# Patient Record
Sex: FEMALE | Race: WHITE | NOT HISPANIC OR LATINO | Employment: OTHER | URBAN - METROPOLITAN AREA
[De-identification: names, ages, dates, MRNs, and addresses within clinical notes are randomized per-mention and may not be internally consistent; named-entity substitution may affect disease eponyms.]

---

## 2017-03-29 ENCOUNTER — ALLSCRIPTS OFFICE VISIT (OUTPATIENT)
Dept: OTHER | Facility: OTHER | Age: 77
End: 2017-03-29

## 2017-03-29 DIAGNOSIS — G25.0 ESSENTIAL TREMOR: ICD-10-CM

## 2017-03-29 DIAGNOSIS — H55.09 OTHER FORMS OF NYSTAGMUS: ICD-10-CM

## 2017-03-29 DIAGNOSIS — G25.2 OTHER SPECIFIED FORMS OF TREMOR: ICD-10-CM

## 2017-03-31 ENCOUNTER — GENERIC CONVERSION - ENCOUNTER (OUTPATIENT)
Dept: OTHER | Facility: OTHER | Age: 77
End: 2017-03-31

## 2017-04-13 ENCOUNTER — GENERIC CONVERSION - ENCOUNTER (OUTPATIENT)
Dept: OTHER | Facility: OTHER | Age: 77
End: 2017-04-13

## 2017-05-11 ENCOUNTER — ALLSCRIPTS OFFICE VISIT (OUTPATIENT)
Dept: OTHER | Facility: OTHER | Age: 77
End: 2017-05-11

## 2017-05-25 ENCOUNTER — GENERIC CONVERSION - ENCOUNTER (OUTPATIENT)
Dept: OTHER | Facility: OTHER | Age: 77
End: 2017-05-25

## 2018-01-11 NOTE — MISCELLANEOUS
To whom it may concern,    Please be advised that Kori Ugarte : 1940 is a patient under my medical care  Eugenio Fair is excused from jury duty due to gait dysfunction and visual issues  If you have any further questions, please do not hesitate to contact me on [phone]        Sincerely,      Dr Tung Cortez    Electronically signed by:Nicole Mckeon MD  Mar 31 2017 11:33AM EST Author

## 2018-01-13 VITALS
RESPIRATION RATE: 14 BRPM | OXYGEN SATURATION: 95 % | HEART RATE: 80 BPM | DIASTOLIC BLOOD PRESSURE: 91 MMHG | WEIGHT: 127.5 LBS | SYSTOLIC BLOOD PRESSURE: 189 MMHG

## 2018-01-14 VITALS
BODY MASS INDEX: 23.19 KG/M2 | HEART RATE: 102 BPM | HEIGHT: 62 IN | RESPIRATION RATE: 12 BRPM | WEIGHT: 126 LBS | OXYGEN SATURATION: 91 % | DIASTOLIC BLOOD PRESSURE: 80 MMHG | SYSTOLIC BLOOD PRESSURE: 140 MMHG

## 2022-06-28 ENCOUNTER — LAB REQUISITION (OUTPATIENT)
Dept: LAB | Facility: HOSPITAL | Age: 82
End: 2022-06-28
Payer: MEDICARE

## 2022-06-28 DIAGNOSIS — D23.112 OTHER BENIGN NEOPLASM OF SKIN OF RIGHT LOWER EYELID, INCLUDING CANTHUS: ICD-10-CM

## 2022-06-28 PROCEDURE — 88342 IMHCHEM/IMCYTCHM 1ST ANTB: CPT | Performed by: PATHOLOGY

## 2022-06-28 PROCEDURE — 88305 TISSUE EXAM BY PATHOLOGIST: CPT | Performed by: PATHOLOGY
